# Patient Record
Sex: MALE | Race: BLACK OR AFRICAN AMERICAN | NOT HISPANIC OR LATINO | Employment: UNEMPLOYED | URBAN - METROPOLITAN AREA
[De-identification: names, ages, dates, MRNs, and addresses within clinical notes are randomized per-mention and may not be internally consistent; named-entity substitution may affect disease eponyms.]

---

## 2022-07-05 PROBLEM — L03.213 PRESEPTAL CELLULITIS: Status: ACTIVE | Noted: 2022-07-05

## 2022-07-05 PROBLEM — L03.213 PERIORBITAL CELLULITIS OF RIGHT EYE: Status: ACTIVE | Noted: 2022-07-05

## 2022-07-15 ENCOUNTER — TELEPHONE (OUTPATIENT)
Dept: OPHTHALMOLOGY | Facility: CLINIC | Age: 72
End: 2022-07-15
Payer: MEDICARE

## 2022-07-15 NOTE — TELEPHONE ENCOUNTER
----- Message from Janae Dao sent at 7/15/2022  9:40 AM CDT -----  Regarding: Urgent  ER f/u pt call back number is [phone]

## 2022-07-16 ENCOUNTER — HOSPITAL ENCOUNTER (EMERGENCY)
Facility: HOSPITAL | Age: 72
Discharge: HOME | End: 2022-07-16
Attending: EMERGENCY MEDICINE
Payer: MEDICARE

## 2022-07-16 VITALS
HEART RATE: 86 BPM | OXYGEN SATURATION: 99 % | BODY MASS INDEX: 34.53 KG/M2 | DIASTOLIC BLOOD PRESSURE: 91 MMHG | TEMPERATURE: 98 F | HEIGHT: 67 IN | WEIGHT: 220 LBS | RESPIRATION RATE: 18 BRPM | SYSTOLIC BLOOD PRESSURE: 172 MMHG

## 2022-07-16 DIAGNOSIS — H02.401 PTOSIS OF RIGHT EYELID: ICD-10-CM

## 2022-07-16 DIAGNOSIS — H02.409 PTOSIS OF EYELID, UNSPECIFIED LATERALITY: ICD-10-CM

## 2022-07-16 DIAGNOSIS — H05.229 ORBITAL SWELLING: Primary | ICD-10-CM

## 2022-07-16 LAB
BASOPHILS # BLD AUTO: 0.03 K/UL (ref 0–0.2)
BASOPHILS NFR BLD: 0.3 % (ref 0–1.9)
BUN SERPL-MCNC: 16 MG/DL (ref 6–30)
CHLORIDE SERPL-SCNC: 103 MMOL/L (ref 95–110)
CREAT SERPL-MCNC: 1.2 MG/DL (ref 0.5–1.4)
CRP SERPL-MCNC: 11.8 MG/L (ref 0–8.2)
DIFFERENTIAL METHOD: ABNORMAL
EOSINOPHIL # BLD AUTO: 0.3 K/UL (ref 0–0.5)
EOSINOPHIL NFR BLD: 2.9 % (ref 0–8)
ERYTHROCYTE [DISTWIDTH] IN BLOOD BY AUTOMATED COUNT: 16.6 % (ref 11.5–14.5)
ERYTHROCYTE [SEDIMENTATION RATE] IN BLOOD BY PHOTOMETRIC METHOD: 66 MM/HR (ref 0–23)
GLUCOSE SERPL-MCNC: 97 MG/DL (ref 70–110)
HCT VFR BLD AUTO: 46.7 % (ref 40–54)
HCT VFR BLD CALC: 45 %PCV (ref 36–54)
HGB BLD-MCNC: 14.1 G/DL (ref 14–18)
IMM GRANULOCYTES # BLD AUTO: 0.03 K/UL (ref 0–0.04)
IMM GRANULOCYTES NFR BLD AUTO: 0.3 % (ref 0–0.5)
LYMPHOCYTES # BLD AUTO: 3 K/UL (ref 1–4.8)
LYMPHOCYTES NFR BLD: 27.7 % (ref 18–48)
MCH RBC QN AUTO: 25.2 PG (ref 27–31)
MCHC RBC AUTO-ENTMCNC: 30.2 G/DL (ref 32–36)
MCV RBC AUTO: 83 FL (ref 82–98)
MONOCYTES # BLD AUTO: 0.9 K/UL (ref 0.3–1)
MONOCYTES NFR BLD: 8.3 % (ref 4–15)
NEUTROPHILS # BLD AUTO: 6.5 K/UL (ref 1.8–7.7)
NEUTROPHILS NFR BLD: 60.5 % (ref 38–73)
NRBC BLD-RTO: 0 /100 WBC
PLATELET # BLD AUTO: 319 K/UL (ref 150–450)
PMV BLD AUTO: 10.9 FL (ref 9.2–12.9)
POC IONIZED CALCIUM: 1.34 MMOL/L (ref 1.06–1.42)
POC TCO2 (MEASURED): 30 MMOL/L (ref 23–29)
POTASSIUM BLD-SCNC: 4.1 MMOL/L (ref 3.5–5.1)
RBC # BLD AUTO: 5.6 M/UL (ref 4.6–6.2)
SAMPLE: ABNORMAL
SODIUM BLD-SCNC: 142 MMOL/L (ref 136–145)
T3 SERPL-MCNC: 80 NG/DL (ref 60–180)
T4 FREE SERPL-MCNC: 0.95 NG/DL (ref 0.71–1.51)
TSH SERPL DL<=0.005 MIU/L-ACNC: 1.99 UIU/ML (ref 0.4–4)
WBC # BLD AUTO: 10.76 K/UL (ref 3.9–12.7)

## 2022-07-16 PROCEDURE — 99285 EMERGENCY DEPT VISIT HI MDM: CPT | Mod: 25

## 2022-07-16 PROCEDURE — 84439 ASSAY OF FREE THYROXINE: CPT | Performed by: PHYSICIAN ASSISTANT

## 2022-07-16 PROCEDURE — 80047 BASIC METABLC PNL IONIZED CA: CPT

## 2022-07-16 PROCEDURE — 84443 ASSAY THYROID STIM HORMONE: CPT | Performed by: PHYSICIAN ASSISTANT

## 2022-07-16 PROCEDURE — 99284 EMERGENCY DEPT VISIT MOD MDM: CPT | Mod: ,,, | Performed by: PHYSICIAN ASSISTANT

## 2022-07-16 PROCEDURE — 85652 RBC SED RATE AUTOMATED: CPT | Performed by: PHYSICIAN ASSISTANT

## 2022-07-16 PROCEDURE — 85025 COMPLETE CBC W/AUTO DIFF WBC: CPT | Performed by: PHYSICIAN ASSISTANT

## 2022-07-16 PROCEDURE — A9585 GADOBUTROL INJECTION: HCPCS | Performed by: EMERGENCY MEDICINE

## 2022-07-16 PROCEDURE — 25500020 PHARM REV CODE 255: Performed by: EMERGENCY MEDICINE

## 2022-07-16 PROCEDURE — 86140 C-REACTIVE PROTEIN: CPT | Performed by: PHYSICIAN ASSISTANT

## 2022-07-16 PROCEDURE — 99284 PR EMERGENCY DEPT VISIT,LEVEL IV: ICD-10-PCS | Mod: ,,, | Performed by: PHYSICIAN ASSISTANT

## 2022-07-16 PROCEDURE — 25000003 PHARM REV CODE 250: Performed by: PHYSICIAN ASSISTANT

## 2022-07-16 PROCEDURE — 84480 ASSAY TRIIODOTHYRONINE (T3): CPT | Performed by: PHYSICIAN ASSISTANT

## 2022-07-16 RX ORDER — GADOBUTROL 604.72 MG/ML
10 INJECTION INTRAVENOUS
Status: COMPLETED | OUTPATIENT
Start: 2022-07-16 | End: 2022-07-16

## 2022-07-16 RX ORDER — CLINDAMYCIN HYDROCHLORIDE 150 MG/1
150 CAPSULE ORAL 3 TIMES DAILY
COMMUNITY

## 2022-07-16 RX ADMIN — IOHEXOL 100 ML: 350 INJECTION, SOLUTION INTRAVENOUS at 01:07

## 2022-07-16 RX ADMIN — GADOBUTROL 10 ML: 604.72 INJECTION INTRAVENOUS at 05:07

## 2022-07-16 RX ADMIN — HYPROMELLOSE 2910 1 DROP: 5 SOLUTION OPHTHALMIC at 01:07

## 2022-07-16 NOTE — PROVIDER PROGRESS NOTES - EMERGENCY DEPT.
Encounter Date: 7/16/2022    ED Physician Progress Notes           Patient signed out to me by my colleague with instructions to follow-up pending work-up. Please see main ED note for previous ED stay documentation. Patient signed out to me with MRI and Ophthalmology recs pending.    MRI orbits and MRV brain obtained. Preseptal cellulitis findings. No postseptal or intracranial involvement. No abnormal asymmetric cavernous sinus enhancement or other focal signal abnormality. Chronic microvascular ischemic changes. Multiple remote lacunar infarcts.     Discussed with Ophthalmology, ptosis likely related to aponeurotic disinsertion 2/2 trauma and exaggerated by recent swelling from preseptal cellulitis. Patient's photo on initial presentation on 7/4/22 had blood on it. No additional ABx or eye gtts recommended at this time. Advised patient to follow-up with Ophthalmology in clinic as well as oculoplastics at [facility].       ED Diagnosis:  Final diagnoses:  [H02.409] Ptosis of eyelid, unspecified laterality  [H05.229] Orbital swelling (Primary)    ED Disposition:   ED Disposition Condition    Discharge Stable

## 2022-07-16 NOTE — ED NOTES
Patient identifiers for Luiz Reed 72 y.o. male checked and correct.  Chief Complaint   Patient presents with    Eye Problem     Swelling x 6 days. Taking abx to treat.      Past Medical History:   Diagnosis Date    Diabetes mellitus     Hypertension      Allergies reported: Review of patient's allergies indicates:  No Known Allergies      LOC: Patient is awake, alert, and aware of environment with an appropriate affect. Patient is oriented x 4 and speaking appropriately.  APPEARANCE: Patient resting comfortably and in no acute distress. Patient is clean and well groomed, patient's clothing is properly fastened.  HEENT: - Jvd, + midline trach, - bruising or lacerations to face or neck. Swollen R eyelid, no drainage or smell noted, not warm to touch.  SKIN: The skin is warm and dry. Patient has normal skin turgor and moist mucus membranes.   MUSKULOSKELETAL: Patient is moving all extremities well, no obvious deformities noted. Pulses intact. PMS x 4.  RESPIRATORY: Airway is open and patent. Respirations are spontaneous and non-labored with normal effort and rate. = CBBS. Denies SOB.  CARDIAC: No peripheral edema noted. + 2 bilateral pedal and radial pulses. < 3 s capillary refill.  ABDOMEN: No distention noted. Soft and non-tender upon palpation.  NEUROLOGICAL: pupils 3 mm, PERRL. Facial expression is symmetrical. Hand grasps are equal bilaterally. Normal sensation in all extremities when touched with finger.

## 2022-07-16 NOTE — CONSULTS
"Consultation Report  Ophthalmology Service    Date: 07/16/2022    Chief complaint/Reason for Consult: "abnormal MRI, hx of preseptal cellulitis, improvement however possible cavernous inf"      History of Present Illness: Luiz Reed is a 72 y.o. male with no significant POHx who present who presents for RUL ptosis. Patient is on day 7 of PO clindamycin for treatment of Preseptal cellulitis OD. Per chart review, swelling OD started on 7/4 when he woke with facial abrasions and swollen OD. Patient was unable to recall the trauma that preceded the swelling.  Patient states that his right eye has been closed for the past 10 days 2/2 periorbital edema, which has greatly improved while on abx. Denies any associated HA, focal neurologic deficits, scalp tenderness, fatigue, fevers/chills, vision changes, flashes, floaters, curtains/veils over vision, or ocular discomfort OU.    POcularHx: Denies history of ocular problems or past ocular surgeries. Refractive error.     Current eye gtts: Denies     Family Hx: Denies family history of glaucoma, macular degeneration, or blindness. family history is not on file.     PMHx:  has a past medical history of Diabetes mellitus and Hypertension.     PSurgHx:  has no past surgical history on file.     Home Medications:   Prior to Admission medications    Medication Sig Start Date End Date Taking? Authorizing Provider   clindamycin (CLEOCIN) 150 MG capsule Take 150 mg by mouth 3 (three) times daily.    Historical Provider        Medications this encounter:     Allergies: has No Known Allergies.     Social:  reports that he has quit smoking. He has never used smokeless tobacco. He reports that he does not use drugs.     ROS: As per HPI    Ocular examination/Dilated fundus examination:  Base Eye Exam     Visual Acuity (Snellen - Linear)       Right Left    Dist sc 20/50 20/50    Dist ph sc 20/25 -1 20/30 -2          Tonometry (Tonopen, 4:23 PM)       Right Left    Pressure 12 14       "    Pupils       Pupils Dark Light Shape React APD    Right PERRL 4 3 Round Brisk None    Left PERRL 4 3 Round Brisk None          Visual Fields       Right Left     Full Full   Full with RUL held open/taped             Extraocular Movement       Right Left     Ortho Ortho     -1 0 0   -1  0   -1 0 0    0 0 0   0  0   0 0 0             Neuro/Psych     Oriented x3: Yes          Dilation     Both eyes: 1% Tropicamide, 2.5% Phenylephrine, 2% Cyclopentolate @ 4:24 PM            Slit Lamp and Fundus Exam     External Exam       Right Left    External mild periorbital edema with RUL ptosis, no erythema or ttp, see photo Normal          Slit Lamp Exam       Right Left    Lids/Lashes RUL ptosis and edema UL> LL as above Normal    Conjunctiva/Sclera White and quiet White and quiet    Cornea Arcus Arcus    Anterior Chamber Deep and formed Deep and formed    Iris Round and reactive Round and reactive    Lens Nuclear sclerosis Nuclear sclerosis    Anterior Vitreous Normal Normal          Fundus Exam       Right Left    Disc Pink & sharp, PPA Pink & sharp, PPA    C/D Ratio 0.4 0.4    Macula Flat Flat    Vessels Normal Normal    Periphery Flat w/o heme/tear/detatchment  Flat w/o heme/tear/detatchment                   7/4/22 prior to abx initiation             7/16/22    Imaging:   CT Orbits w/ Contrast:   Impression:  There is continued but reduced soft tissue swelling induration right periorbital preseptal soft tissues concerning for evolving cellulitis without postseptal involvement.  Please note however there is subtle asymmetrical enhancement of the right cavernous sinus while this may be a developmental variant in light of clinical history active cavernous sinus inflammatory/infectious process cannot be excluded. Clinical correlation, ophthalmological evaluation and further evaluation with MRI orbits with and without contrast advised if patient compatible.    MRI Orbits w/ w/o 7/16/22  Impression:  1. Findings compatible  with known preseptal cellulitis.  No postseptal inflammation or intraorbital abscess.  2. No abnormal asymmetric cavernous sinus enhancement or other focal signal abnormality to correlate with the finding from recent orbit CT.  3. Advanced supratentorial leukoencephalopathy, nonspecific, but likely reflecting sequelae chronic microvascular ischemic change.  4. Multiple remote bilateral lacunar type infarcts including basal ganglia, thalami, and deep periventricular white matter.  No evidence of acute infarct.  5. Ventriculomegaly out of proportion of degree of sulcal enlargement.  Favor central predominant cerebral volume loss, but configuration does raise the possibility of normal pressure hydrocephalus. Clinical correlation required.    MRV Brain w/o 7/16/22:  Impression:  No evidence of dural venous sinus thrombosis.    Assessment/Plan:     1. Preseptal cellulitis RIGHT eye, RESOLVED  2. Ptosis, RIGHT eye   - Patient presents with 12 days of RUL periorbital swelling and ptosis following unclear hx of preceding trauma vs infection. Pt presented originally 7/4/22 then again on 7/10/22, where he was sent home with PO clindamycin 450mg q8h. Patient reports the swelling has significantly improved since starting abx, but he has still been unable to open the right eye.   - Patient w/ near complete ptosis of right eye with some levator fx on upgaze (6-10mm), trace RUL swelling, no proptosis, PERRL with no RAPD, EOMI w/ possibly mild limitation of Abduction of right eye in the setting of uncontrolled HTN and DM could be related to microvascular changes, no periorbital pain or pain with eye movement, no visual changes   - No sx concerning for GCA, facial sensation intact bilaterally, no photos prior to initial presentation available for comparison (left drivers license at home)   - ESR 66, CRP 11.8, WBC wnl and patient has significantly improved on Clindamycin (completed 6/7 days)   - CT Orbits w/ contrast w/ some concern  for cavernous sinus inflammation   - MRI suggestive of resolving preseptal cellulitis  without evidence of cavernous sinus involvement and MRV without evidence of dural venous sinus thrombosis   - TSH, T3/T4, and myasthenia labs ordered, pending   - 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

## 2022-07-16 NOTE — ED NOTES
I-STAT Chem-8+ Results:   Value Reference Range   Sodium 142 136-145 mmol/L   Potassium  4.1 3.5-5.1 mmol/L   Chloride 103  mmol/L   Ionized Calcium 1.34 1.06-1.42 mmol/L   CO2 (measured) 30 23-29 mmol/L   Glucose 97  mg/dL   BUN 16 6-30 mg/dL   Creatinine 1.2 0.5-1.4 mg/dL   Hematocrit 45 36-54%

## 2022-07-16 NOTE — ED TRIAGE NOTES
73 y/o M presents to ER with c/c swollen eye lid. Pt states that 10 days ago (approximately) he woke up with a swollen R eye,was hospitalized for several days, then discharged with antibiotics. Pt states that he has been taking the antibx on time. Pt denies fever, pain, or discharge in eye at this time. Pt states that he has come to ER because eye lid has not been opening as well as he would like.

## 2022-07-16 NOTE — ED PROVIDER NOTES
Encounter Date: 7/16/2022       History     Chief Complaint   Patient presents with    Eye Problem     Swelling x 6 days. Taking abx to treat.      10:44 AM  This is a 73 y/o male with PMH HTN and DM, preorbital cellulitis, discharged on 07/05/2022 with a 7 day course of clindamycin who presents to [facility] ED with L lid ptosis for 6 days.    Patient states his last dose of clindamycin yesterday.  States that he kept one dose to show the ED provider.  States that since being diagnosed with preorbital cellulitis, he has not regained function of his eyelids.  States that cannot open his L eyes anymore.  States that he will occasionally feel it open spontaneously, but just little.  Has use a Band-Aid to pull back his upper eyelid.  States that it is blurry, but when I pull his eyelids back, he states that his vision is at his baseline.  Denied having any pain since treatment for his cellulitis.  Has not had any fever, chills, trauma or injury.        Review of patient's allergies indicates:  No Known Allergies  Past Medical History:   Diagnosis Date    Diabetes mellitus     Hypertension      History reviewed. No pertinent surgical history.  History reviewed. No pertinent family history.  Social History     Tobacco Use    Smoking status: Former Smoker    Smokeless tobacco: Never Used   Substance Use Topics    Drug use: Never     Review of Systems   Constitutional: Negative for fever.   HENT: Negative for sore throat.    Eyes: Positive for visual disturbance. Negative for pain and redness.        +ptosis   Respiratory: Negative for shortness of breath.    Cardiovascular: Negative for chest pain.   Gastrointestinal: Negative for nausea.   Genitourinary: Negative for dysuria.   Musculoskeletal: Negative for back pain.   Skin: Negative for rash.   Neurological: Negative for weakness.   Hematological: Does not bruise/bleed easily.       Physical Exam     Initial Vitals [07/16/22 0925]   BP Pulse Resp Temp SpO2   (!) 186/83  88 16 98.4 °F (36.9 °C) 98 %      MAP       --         Physical Exam    Vitals reviewed.  Constitutional: He appears well-developed and well-nourished. He is not diaphoretic. He is cooperative.  Non-toxic appearance. He does not have a sickly appearance. He does not appear ill. No distress. Face mask in place.   HENT:   Head: Normocephalic and atraumatic.   Nose: Nose normal.   Mouth/Throat: No trismus in the jaw.   Eyes: Conjunctivae and EOM are normal. Pupils are equal, round, and reactive to light. Right eye exhibits no chemosis, no discharge and no exudate. Left eye exhibits no chemosis, no discharge and no exudate. Right conjunctiva is not injected. Right conjunctiva has no hemorrhage. Left conjunctiva is not injected. Left conjunctiva has no hemorrhage. No scleral icterus. Right eye exhibits no nystagmus. Left eye exhibits no nystagmus.   Ptosis on the R. See image below.  No lid erythema, edema, fluctuance, induration. Improved from image in media tab on 7/4/2022.  Unable to open eye lids.    Neck:   Normal range of motion.  Pulmonary/Chest: No accessory muscle usage. No tachypnea. No respiratory distress.   Abdominal: He exhibits no distension.   Musculoskeletal:         General: Normal range of motion.      Cervical back: Normal range of motion.     Neurological: He is alert. He has normal strength.   Skin: Skin is dry. No pallor.                 ED Course   Procedures  Labs Reviewed   CBC W/ AUTO DIFFERENTIAL - Abnormal; Notable for the following components:       Result Value    MCH 25.2 (*)     MCHC 30.2 (*)     RDW 16.6 (*)     All other components within normal limits   C-REACTIVE PROTEIN - Abnormal; Notable for the following components:    CRP 11.8 (*)     All other components within normal limits   SEDIMENTATION RATE - Abnormal; Notable for the following components:    Sed Rate 66 (*)     All other components within normal limits   ISTAT PROCEDURE - Abnormal; Notable for the following components:     POC TCO2 (MEASURED) 30 (*)     All other components within normal limits   TSH   T4, FREE   T3   MYASTHENIA GRAVIS EVAL WITH MUSK REFLEX   T4, FREE    Narrative:     ADD ON FT4 TSH T3 ORD#[id number] N RAYMOND 07/16/22 @1734   TSH    Narrative:     ADD ON FT4 TSH T3 ORD#[id number] LPN RAYMOND 07/16/22 @1734   T3    Narrative:     ADD ON FT4 TSH T3 ORD#[id number] N RAYMOND 07/16/22 @1734          Imaging Results          MRI Orbits W W/O Contrast (Final result)  Result time 07/16/22 18:51:03    Final result by Reinaldo Vogt MD (07/16/22 18:51:03)                 Impression:      1. Findings compatible with known preseptal cellulitis.  No postseptal inflammation or intraorbital abscess.  2. No abnormal asymmetric cavernous sinus enhancement or other focal signal abnormality to correlate with the finding from recent orbit CT.  3. Advanced supratentorial leukoencephalopathy, nonspecific, but likely reflecting sequelae chronic microvascular ischemic change.  4. Multiple remote bilateral lacunar type infarcts including basal ganglia, thalami, and deep periventricular white matter.  No evidence of acute infarct.  5. Ventriculomegaly out of proportion of degree of sulcal enlargement.  Favor central predominant cerebral volume loss, but configuration does raise the possibility of normal pressure hydrocephalus. Clinical correlation required.    Electronically signed by resident: Amrit Logan  Date:    07/16/2022  Time:    18:07    Electronically signed by: Reinaldo Vogt MD  Date:    07/16/2022  Time:    18:51             Narrative:    EXAMINATION:  MRI ORBITS W W/O CONTRAST    CLINICAL HISTORY:  Periorbital cellulitis (Ped 0-18y);    TECHNIQUE:  Multiplanar multisequence imaging of brain was obtained.  Additional thin slice dedicated multiplanar multisequence MR imaging of the orbits was performed before and after the administration of 10 mL Gadavist intravenous contrast.    COMPARISON:  CT orbits 07/16/2022; CT head  04/08/2022    FINDINGS:  Orbits/Optic Nerves: Asymmetric soft tissue swelling and enhancement in the right peripheral soft tissues.  No abnormal signal or enhancement of the right globe, intra-orbital soft tissues, or optic pathway.  Cavernous sinus enhancement appears normal and symmetrical in this exam, with no abnormality to correlate with the finding from same day contrast enhanced CT of the orbits.    Remainder of the Intracranial Compartment (limited evaluation):    Moderate generalized cerebral volume loss.    Prominence of the ventricles which are stable in configuration from prior head CT 04/08/2022.  Third ventricle diameter measuring up to 1.4 cm.  Ventricular enlargement out of proportion to the degree of sulcal enlargement, with some crowding of the sulci near the vertex.  Small cavum septum pellucidum et vergae.    Significant confluent T2/FLAIR signal hyperintensity throughout the periventricular supratentorial white matter extending cranially near the vertex.  No parenchymal mass or hemorrhage.  No evidence for acute infarct.  No midline shift or herniation.  Remote bilateral corona radiata infarcts.  Additional remote infarcts in the bilateral basal ganglia and thalami and right paracentral ole.  Unchanged small infarct at the right cerebellar hemisphere.    Skull/Extracranial Contents (limited evaluation): Bone marrow signal intensity is normal.  Paranasal sinuses are essentially clear.                               MRV Brain Without Contrast (Final result)  Result time 07/16/22 18:06:43    Final result by Reinaldo Vogt MD (07/16/22 18:06:43)                 Impression:      No evidence of dural venous sinus thrombosis.    Electronically signed by resident: Amrit Logan  Date:    07/16/2022  Time:    17:48    Electronically signed by: Reinaldo Vogt MD  Date:    07/16/2022  Time:    18:06             Narrative:    EXAMINATION:  MRV BRAIN WITHOUT CONTRAST    CLINICAL HISTORY:  Dural venous sinus  thrombosis suspected;    TECHNIQUE:  Non-contrast MR venogram of the intracranial vasculature with MIP reconstructions    COMPARISON:  MRI orbits 07/16/2022; CT head 04/08/2022    FINDINGS:  Normal flow related signal within the dural venous sinuses and deep cerebral veins.                                CT ORBITS WITH CONTRAST (Final result)  Result time 07/16/22 14:27:07    Final result by Scott Andrade DO (07/16/22 14:27:07)                 Impression:      There is continued but reduced soft tissue swelling induration right periorbital preseptal soft tissues concerning for evolving cellulitis without postseptal involvement.    Please note however there is subtle asymmetrical enhancement of the right cavernous sinus while this may be a developmental variant in light of clinical history active cavernous sinus inflammatory/infectious process cannot be excluded.  Clinical correlation, ophthalmological evaluation and further evaluation with MRI orbits with and without contrast advised if patient compatible.    Electronically signed by resident: Harshal Craig  Date:    07/16/2022  Time:    13:29    Electronically signed by: Scott Andrade DO  Date:    07/16/2022  Time:    14:27             Narrative:    EXAMINATION:  CT ORBITS WITH CONTRAST    CLINICAL HISTORY:  Periorbital cellulitis (Ped 0-18y);    TECHNIQUE:  Low dose CT images throughout the region of the orbits administration of 100 mL of Omnipaque 350 IV contrast..  Axial, sagittal and coronal reformations were obtained.    COMPARISON:  CT max face 07/04/2022    FINDINGS:  Interval reduction of abnormal soft tissue swelling induration overlying the right periorbital preseptal soft tissues.  No evidence for postseptal induration.  There is continued question bilateral globe proptosis.  The globes and extraocular muscles are relatively symmetric.  No evidence for intra or extraconal enhancing mass lesion.    Trace mucosal thickening in the ethmoid air cells  remaining visualized paranasal sinuses are clear.    Incidental remote fracture deformities of the nasal bones.    Somewhat unusual asymmetrical enhancement in the right cavernous sinus compared to the left in light of history cannot exclude inflammatory/infectious process involving the right cavernous sinus Hansel-Hunt to be considered in differential.  Clinical correlation, ophthalmological evaluation and further evaluation with MRI orbits with and without contrast if patient compatible.    This report was flagged in Epic as abnormal.                                 Medications   iohexoL (OMNIPAQUE 350) injection 100 mL (100 mLs Intravenous Given 7/16/22 1324)   gadobutroL (GADAVIST) injection 10 mL (10 mLs Intravenous Given 7/16/22 1746)     Medical Decision Making:   History:   Old Medical Records: I decided to obtain old medical records.  Old Records Summarized: records from clinic visits and records from previous admission(s).  Initial Assessment:   This is a 71 y/o male with PMH HTN and DM, preorbital cellulitis, discharged on 07/05/2022 with a 7 day course of clindamycin who presents to [facility] ED with L lid ptosis for 6 days.  Differential Diagnosis:   Includes but is not limited to ptosis, palsy, preorbital cellulitis, preseptal cellulitis.  Doubt although cellulitis.  No injection own, hemorrhage, discharge.  EOMs intact and symmetric.  Is  Clinical Tests:   Lab Tests: Reviewed and Ordered  Radiological Study: Ordered and Reviewed  ED Management:  Will initiate workup she, obtain CT orbits, and continue monitor.               ED Course as of 07/16/22 2243   Sat Jul 16, 2022   1042 BP(!): 186/83 [CL]   1042 Temp: 98.4 °F (36.9 °C) [CL]   1042 Pulse: 88 [CL]   1042 Resp: 16 [CL]   1042 SpO2: 98 % [CL]   1125 Case discussed with Ophthalmology who recommended starting with CT. [CL]   1459 CT shows reduced soft tissue swelling periorbital preseptal soft tissue, signifying evolving cellulitis.  There is asymmetric  enhancement of the right cavernous sinus.  Case discussed with Ophtho who will give recommendations. Will obtain MRI.  [CL]      ED Course User Index  [CL] Binta Urrutia PA-C           4:00 PM  Case will be signed out to in coming ED team pending MRI and MRV results and ophthalmology evaluation.  Refer to their consult note.  Refer to progress note.    UPDATE:  MR shows no post septal inflammation. No abnormal asymmetric cavernous sinus enhancement as seen on recent orbital CT. Remote infarcts. Note acute infarcts. Ophth OK for discharge with follow up with Occuloplastics. Refer to their full consult note.         I have reviewed patient's chart and discussed this case with my supervising MD.     Binta Urrutia PA-C  Emergent Department  [facility]  Spectralink #[id number] or #[phone]        Clinical Impression:   Final diagnoses:  [H02.409] Ptosis of eyelid, unspecified laterality  [H05.229] Orbital swelling (Primary)          ED Disposition Condition    Discharge Stable        ED Prescriptions     None        Follow-up Information     Follow up With Specialties Details Why Contact Info Additional Information    [facility] - [address] [place] [address] Ophthalmology   [address]  [facility] [address] [phone] Please arrive on the 10th floor for check-in.    [facility] Surgical Oncology, Orthopedic Surgery, Genetics, Physical Medicine and Rehabilitation, Occupational Therapy, Radiology  Oculoplastics [address] [facility] [address]  [phone]       [facility] Ophthalmology, Optometry   [address] [facility] [address]  [phone]                Binta Urrutia PA-C  07/16/22 5119

## 2022-07-17 NOTE — ED NOTES
PA at bedside to explain test and POC and dc isntrx. Pt will require Lyft to go home ( tire flat on his truck).

## 2022-07-17 NOTE — DISCHARGE INSTRUCTIONS
Follow-up with our eye clinic using the below contact information.    Additionally follow-up with ocular plastics at [facility] for management. Use the below contact information to obtain an appointment.    Return to the emergency room for new, worsening, or concerning symptoms.

## 2022-07-19 ENCOUNTER — TELEPHONE (OUTPATIENT)
Dept: OPHTHALMOLOGY | Facility: CLINIC | Age: 72
End: 2022-07-19
Payer: MEDICARE

## 2022-07-19 NOTE — TELEPHONE ENCOUNTER
I called [facility] left message this pt needs to be seen at the next available appointment for ptosis eval. Pt. Phone # and  provided

## 2024-06-07 NOTE — TELEPHONE ENCOUNTER
----- Message from Rosemarie Solo sent at 7/19/2022 10:57 AM CDT -----  Regarding: FW: Follow up  MD is asking for it to be sent to [facility]   ----- Message -----  From: Diana Dupree MA  Sent: 7/18/2022  10:40 AM CDT  To: Jeannie Carlin PCT, Rosemarie Solo  Subject: FW: Follow up                                      ----- Message -----  From: Konstantin Ron MD  Sent: 7/16/2022   7:06 PM CDT  To: [facility] Triage  Subject: Follow up                                        Hi,    Can we please get this patient follow up in [facility] Oculoplastics clinic next available for Right eye ptosis evaluation. Thanks!    Patient's Best Contact Number: [phone]      Konstantin Ron MD  [facility], PGY-2           Thank you for coming to the ER today.    Follow up with your regular doctor (or the doctor listed in your discharge instructions) in 1-2 days. Call to make an appointment.     Return to the ER immediately for worsening symptoms of low back pain, or new fevers, headache, chest pain, shortness of breath, vomiting, abdominal pain, weakness, or other symptoms as discussed in the ER today.

## 2025-03-16 ENCOUNTER — HOSPITAL ENCOUNTER (EMERGENCY)
Facility: HOSPITAL | Age: 75
End: 2025-03-16
Attending: STUDENT IN AN ORGANIZED HEALTH CARE EDUCATION/TRAINING PROGRAM
Payer: MEDICARE

## 2025-03-16 VITALS
HEART RATE: 68 BPM | SYSTOLIC BLOOD PRESSURE: 144 MMHG | DIASTOLIC BLOOD PRESSURE: 78 MMHG | OXYGEN SATURATION: 99 % | HEIGHT: 67 IN | TEMPERATURE: 98 F | RESPIRATION RATE: 16 BRPM | BODY MASS INDEX: 34.53 KG/M2 | WEIGHT: 220 LBS

## 2025-03-16 DIAGNOSIS — M25.569 KNEE PAIN: ICD-10-CM
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
## 2025-03-16 DIAGNOSIS — R05.9 COUGH: ICD-10-CM

## 2025-03-16 LAB
ALBUMIN SERPL BCP-MCNC: 2.8 G/DL (ref 3.5–5.2)
ALP SERPL-CCNC: 71 U/L (ref 40–150)
ALT SERPL W/O P-5'-P-CCNC: 15 U/L (ref 10–44)
ANION GAP SERPL CALC-SCNC: 6 MMOL/L (ref 8–16)
AST SERPL-CCNC: 14 U/L (ref 10–40)
BASOPHILS # BLD AUTO: 0.04 K/UL (ref 0–0.2)
BASOPHILS NFR BLD: 0.5 % (ref 0–1.9)
BILIRUB SERPL-MCNC: 0.3 MG/DL (ref 0.1–1)
BNP SERPL-MCNC: 52 PG/ML (ref 0–99)
BUN SERPL-MCNC: 15 MG/DL (ref 8–23)
CALCIUM SERPL-MCNC: 9.2 MG/DL (ref 8.7–10.5)
CHLORIDE SERPL-SCNC: 110 MMOL/L (ref 95–110)
CO2 SERPL-SCNC: 22 MMOL/L (ref 23–29)
CREAT SERPL-MCNC: 0.9 MG/DL (ref 0.5–1.4)
DIFFERENTIAL METHOD BLD: ABNORMAL
EOSINOPHIL # BLD AUTO: 0.6 K/UL (ref 0–0.5)
EOSINOPHIL NFR BLD: 7.7 % (ref 0–8)
ERYTHROCYTE [DISTWIDTH] IN BLOOD BY AUTOMATED COUNT: 14.8 % (ref 11.5–14.5)
EST. GFR  (NO RACE VARIABLE): >60 ML/MIN/1.73 M^2
GLUCOSE SERPL-MCNC: 87 MG/DL (ref 70–110)
HCT VFR BLD AUTO: 40.2 % (ref 40–54)
HCV AB SERPL QL IA: NORMAL
HGB BLD-MCNC: 11.9 G/DL (ref 14–18)
HIV 1+2 AB+HIV1 P24 AG SERPL QL IA: NORMAL
IMM GRANULOCYTES # BLD AUTO: 0.03 K/UL (ref 0–0.04)
IMM GRANULOCYTES NFR BLD AUTO: 0.4 % (ref 0–0.5)
INFLUENZA A, MOLECULAR: NEGATIVE
INFLUENZA B, MOLECULAR: NEGATIVE
LYMPHOCYTES # BLD AUTO: 2 K/UL (ref 1–4.8)
LYMPHOCYTES NFR BLD: 26.7 % (ref 18–48)
MCH RBC QN AUTO: 24.3 PG (ref 27–31)
MCHC RBC AUTO-ENTMCNC: 29.6 G/DL (ref 32–36)
MCV RBC AUTO: 82 FL (ref 82–98)
MONOCYTES # BLD AUTO: 0.5 K/UL (ref 0.3–1)
MONOCYTES NFR BLD: 6.8 % (ref 4–15)
NEUTROPHILS # BLD AUTO: 4.2 K/UL (ref 1.8–7.7)
NEUTROPHILS NFR BLD: 57.9 % (ref 38–73)
NRBC BLD-RTO: 0 /100 WBC
PLATELET # BLD AUTO: 339 K/UL (ref 150–450)
PMV BLD AUTO: 12.1 FL (ref 9.2–12.9)
POTASSIUM SERPL-SCNC: 3.9 MMOL/L (ref 3.5–5.1)
PROT SERPL-MCNC: 7.3 G/DL (ref 6–8.4)
RBC # BLD AUTO: 4.9 M/UL (ref 4.6–6.2)
SARS-COV-2 RDRP RESP QL NAA+PROBE: NEGATIVE
SODIUM SERPL-SCNC: 138 MMOL/L (ref 136–145)
SPECIMEN SOURCE: NORMAL
TROPONIN I SERPL DL<=0.01 NG/ML-MCNC: 9 NG/L (ref 0–35)
WBC # BLD AUTO: 7.31 K/UL (ref 3.9–12.7)

## 2025-03-16 PROCEDURE — 93005 ELECTROCARDIOGRAM TRACING: CPT

## 2025-03-16 PROCEDURE — 87502 INFLUENZA DNA AMP PROBE: CPT

## 2025-03-16 PROCEDURE — 25500020 PHARM REV CODE 255: Performed by: STUDENT IN AN ORGANIZED HEALTH CARE EDUCATION/TRAINING PROGRAM

## 2025-03-16 PROCEDURE — 84484 ASSAY OF TROPONIN QUANT: CPT

## 2025-03-16 PROCEDURE — 85025 COMPLETE CBC W/AUTO DIFF WBC: CPT

## 2025-03-16 PROCEDURE — 87389 HIV-1 AG W/HIV-1&-2 AB AG IA: CPT | Performed by: PHYSICIAN ASSISTANT

## 2025-03-16 PROCEDURE — 99285 EMERGENCY DEPT VISIT HI MDM: CPT | Mod: 25

## 2025-03-16 PROCEDURE — 83880 ASSAY OF NATRIURETIC PEPTIDE: CPT

## 2025-03-16 PROCEDURE — 25000003 PHARM REV CODE 250

## 2025-03-16 PROCEDURE — 87635 SARS-COV-2 COVID-19 AMP PRB: CPT

## 2025-03-16 PROCEDURE — A9585 GADOBUTROL INJECTION: HCPCS | Performed by: STUDENT IN AN ORGANIZED HEALTH CARE EDUCATION/TRAINING PROGRAM

## 2025-03-16 PROCEDURE — 86803 HEPATITIS C AB TEST: CPT | Performed by: PHYSICIAN ASSISTANT

## 2025-03-16 PROCEDURE — 80053 COMPREHEN METABOLIC PANEL: CPT | Performed by: STUDENT IN AN ORGANIZED HEALTH CARE EDUCATION/TRAINING PROGRAM

## 2025-03-16 RX ORDER — GADOBUTROL 604.72 MG/ML
10 INJECTION INTRAVENOUS
Status: COMPLETED | OUTPATIENT
Start: 2025-03-16 | End: 2025-03-16

## 2025-03-16 RX ORDER — ACETAMINOPHEN 325 MG/1
650 TABLET ORAL
Status: COMPLETED | OUTPATIENT
Start: 2025-03-16 | End: 2025-03-16

## 2025-03-16 RX ADMIN — ACETAMINOPHEN 650 MG: 325 TABLET ORAL at 09:03

## 2025-03-16 RX ADMIN — GADOBUTROL 10 ML: 604.72 INJECTION INTRAVENOUS at 03:03

## 2025-03-16 NOTE — ED PROVIDER NOTES
Encounter Date: 3/16/2025    Physician Attestation Statement for Resident:  As the supervising MD   Physician Attestation Statement: I have personally seen and examined this patient.       I agree with the history, exam, and treatment plan.               History     Chief Complaint   Patient presents with    Fall     Arrived from [facility]. Hx of dementia. unwitnessed fall. No visible trauma or injury     HPI    Luiz Reed is a 74 y.o. male w/ a PMHx significant dementia, diabetes, HTN.  Patient presents from [facility] for concerns of an unwitnessed fall via EMS reported no interventions or abnl vitals en route.  Patient states that he does not remember if he fell, but he does recall waking up and having nursing staff telling him that he fell.  Patient is currently complaining of mild left knee pain since the fall.  Patient also w/productive cough; recently diagnosed w/ COVID on 03/04.  Patient denies lightheadedness/dizziness, vision changes, HA, weakness, paresthesias, back pain, CP, SOB, abdominal pain, N/V/D, dysuria/hematuria, lower extremity edema/erythema, or other areas of pain/trauma.    Please see MDM for additional details.    Review of patient's allergies indicates:  No Known Allergies  Past Medical History:   Diagnosis Date    Diabetes mellitus     borderline    Diabetes mellitus     Hypertension      History reviewed. No pertinent surgical history.  No family history on file.  Social History[1]  Review of Systems    Physical Exam     Initial Vitals   BP Pulse Resp Temp SpO2   03/16/25 0839 03/16/25 0839 03/16/25 0839 03/16/25 0840 03/16/25 0839   138/79 87 17 98.3 °F (36.8 °C) 100 %      MAP       --                Physical Exam    Nursing note and vitals reviewed.  Constitutional: He appears well-developed and well-nourished. No distress.   HENT:   Head: Normocephalic and atraumatic.   Nose: Nose normal.   Eyes: Conjunctivae and EOM are normal. Pupils are equal, round,  and reactive to light. No scleral icterus.   Neck:   Normal range of motion.  Cardiovascular:  Normal rate, normal heart sounds and intact distal pulses.           Pulmonary/Chest: Breath sounds normal. No respiratory distress. He exhibits no tenderness.   Abdominal: Abdomen is soft. He exhibits no distension. There is no abdominal tenderness.   Musculoskeletal:         General: No tenderness or edema. Normal range of motion.      Cervical back: Normal range of motion.     Neurological: He has normal strength. No sensory deficit.   Mentation at baseline per report.   Skin: Skin is warm and dry. Capillary refill takes less than 2 seconds.         ED Course   Procedures  Labs Reviewed   CBC W/ AUTO DIFFERENTIAL - Abnormal       Result Value    WBC 7.31      RBC 4.90      Hemoglobin 11.9 (*)     Hematocrit 40.2      MCV 82      MCH 24.3 (*)     MCHC 29.6 (*)     RDW 14.8 (*)     Platelets 339      MPV 12.1      Immature Granulocytes 0.4      Gran # (ANC) 4.2      Immature Grans (Abs) 0.03      Lymph # 2.0      Mono # 0.5      Eos # 0.6 (*)     Baso # 0.04      nRBC 0      Gran % 57.9      Lymph % 26.7      Mono % 6.8      Eosinophil % 7.7      Basophil % 0.5      Differential Method Automated     COMPREHENSIVE METABOLIC PANEL - Abnormal    Sodium 138      Potassium 3.9      Chloride 110      CO2 22 (*)     Glucose 87      BUN 15      Creatinine 0.9      Calcium 9.2      Total Protein 7.3      Albumin 2.8 (*)     Total Bilirubin 0.3      Alkaline Phosphatase 71      AST 14      ALT 15      eGFR >60.0      Anion Gap 6 (*)    INFLUENZA A & B BY MOLECULAR    Influenza A, Molecular Negative      Influenza B, Molecular Negative      Flu A & B Source Nasal swab     HEPATITIS C ANTIBODY    Hepatitis C Ab Non-reactive      Narrative:     Release to patient->Immediate   HIV 1 / 2 ANTIBODY    HIV 1/2 Ag/Ab Non-reactive      Narrative:     Release to patient->Immediate   SARS-COV-2 RNA AMPLIFICATION, QUAL    SARS-CoV-2 RNA,  Amplification, Qual Negative     TROPONIN I HIGH SENSITIVITY    Troponin I High Sensitivity 9     B-TYPE NATRIURETIC PEPTIDE    BNP 52            Imaging Results              MRI Brain W WO Contrast (Final result)  Result time 03/16/25 15:22:07      Final result by Scott Andrade DO (03/16/25 15:22:07)                   Impression:      Generalized cerebral volume loss with scattered areas of encephalomalacia bilateral corona radiata, thalami, right ole and cerebellum most compatible with remote infarcts    Patchy and confluent T2 FLAIR signal abnormality elsewhere supratentorial white matter and ole while nonspecific concerning for possible age advanced chronic microvascular ischemic change.    No evidence for acute infarction or abnormal parenchymal enhancement allowing for artifact from motion    Punctate small foci of susceptibility bilateral thalami with probable additional focus in the left posterior temporal lobe suggestive for remote microhemorrhages.  No evidence for recent hemorrhage    Clinical correlation and follow-up advised      Electronically signed by: Scott Andrade DO  Date:    03/16/2025  Time:    15:22               Narrative:    EXAMINATION:  MRI BRAIN W WO CONTRAST    CLINICAL HISTORY:  Stroke, follow up;    TECHNIQUE:  Sagittal and axial T1, axial T2, axial FLAIR, axial gradient, axial diffusion imaging of the whole brain pre-contrast with postcontrast axial T1 and axial spoiled gradient imaging reformatted in the coronal and sagittal planes.. Ten ml of Gadavist injected intravenously.    COMPARISON:  CT 03/16/2025    FINDINGS:  Study distorted by motion artifacts.  Generalized cerebral volume loss with compensatory enlargement ventricles sulci and cisterns without hydrocephalus.  Confluent T2 FLAIR signal hyperintensity supratentorial white matter and ole while nonspecific concerning for advanced chronic microvascular ischemic change.  Small scattered areas of encephalomalacia bilateral  corona radiata, basal ganglia, thalamus, right ole and cerebellum compatible with remote infarcts.  Punctate foci of susceptibility within the thalami with small component within the left superior thalamus and left posterior temporal lobe suggestive for remote microhemorrhages possibly related to chronic hypertension clinical correlation advised    No evidence for recent hemorrhage.  No evidence for acute infarction.  No definite abnormal parenchymal enhancement allowing for motion artifacts.                                       X-Ray Abdomen AP 1 View (KUB) (Final result)  Result time 03/16/25 14:20:25      Final result by Reinaldo Vogt MD (03/16/25 14:20:25)                   Impression:      No radiographic acute abnormality identified.      Electronically signed by: Reinaldo Vogt MD  Date:    03/16/2025  Time:    14:20               Narrative:    EXAMINATION:  XR ABDOMEN AP 1 VIEW    CLINICAL HISTORY:  Unspecified fall, initial encounter    TECHNIQUE:  AP View(s) of the abdomen was performed.    COMPARISON:  Chest radiograph same day    FINDINGS:  Monitoring leads overlie the imaged chest and abdomen.    Nonobstructive bowel gas pattern.  Organomegaly or significant mass effect.  No free air or abnormal intra-abdominal calcification, noting suspected pelvic phleboliths and scattered advanced atherosclerotic vascular calcifications.    Osseous structures show moderate to advanced degenerative changes of the imaged spine with minimal to mild degenerative change elsewhere.  No acute or destructive osseous process seen.  The imaged lung bases are clear.                                        CT Head Without Contrast (Final result)  Result time 03/16/25 10:15:28      Final result by Scott Andrade DO (03/16/25 10:15:28)                   Impression:      CT head: Generalized cerebral volume loss with patchy and confluent decreased attenuation supratentorial white matter while nonspecific concerning for chronic  ischemic change.  Focal hypodensities left corona radiata, right basal ganglia and bilateral thalami suggestive for remote lacunar-type infarcts.  Superimposed recent infarction cannot be entirely excluded with lack of priors for comparison.  No evidence for acute intracranial hemorrhage or sulcal effacement to suggest large territory recent infarction.  Clinical correlation and further evaluation with MRI as warranted if patient compatible.    Please note there are partially visualized fracture deformity nasal bones bilaterally overall age indeterminate and may be remote.    CT cervical spine: Degenerative change cervical spine without evidence for acute fracture or traumatic subluxation.    See above for additional details.      Electronically signed by: Scott Andrade DO  Date:    03/16/2025  Time:    10:15               Narrative:    EXAMINATION:  CT HEAD WITHOUT CONTRAST; CT CERVICAL SPINE WITHOUT CONTRAST    CLINICAL HISTORY:  Head trauma, minor (Age >= 65y);; Neck trauma (Age >= 65y);    TECHNIQUE:  CT head: Multiple sequential 5 mm axial images of the head without contrast.  Coronal and sagittal reformatted imaging from the axial acquisition.    CT cervical spine: Multiple sequential 1.25 mm axial images of the cervical spine without contrast.  Coronal and sagittal reformatted imaging from the axial acquisition.    COMPARISON:  None    FINDINGS:  CT head: Generalized cerebral volume loss with compensatory enlargement ventricles sulci and cisterns without hydrocephalus.  Hypodensity within the left corona radiata and right basal ganglia most compatible with remote lacunar-type infarcts with additional punctate foci in the bilateral thalami.  There is patchy and confluent decreased attenuation elsewhere supratentorial white matter while nonspecific concerning for advanced chronic microvascular ischemic change.  There is no evidence for acute intracranial hemorrhage or definite large territory recent infarction.   Superimposed recent infarction cannot be entirely excluded.  Further evaluation with MRI as warranted if patient compatible.  There is fracture deformity of the nasal bones bilaterally partially included an age-indeterminate which may be remote.  Visualized paranasal sinuses and mastoid air cells are clear.    CT cervical spine: Straightening of the expected normal cervical lordosis.  There is scattered degenerative disc disease with height loss and endplate degeneration all levels there is diffuse sclerosis within C5 and C6 vertebral bodies likely degenerative.  Allowing for degenerative changes there is no evidence for acute fracture or traumatic subluxation of the cervical spine incomplete ossification posterior arch of C1 compatible with developmental variant.  No consolidation visualized lung apices.  Allowing for artifact from motion and beam hardening degenerative change most pronounced at C5/C6 with posterior disc osteophyte, uncovertebral joint hypertrophy and facet arthropathy with mild moderate central canal stenosis with moderate right greater than left bilateral bony neural foraminal stenosis.    This report was flagged in Epic as abnormal.                                        CT Cervical Spine Without Contrast (Final result)  Result time 03/16/25 10:15:28      Final result by Scott Andrade DO (03/16/25 10:15:28)                   Impression:      CT head: Generalized cerebral volume loss with patchy and confluent decreased attenuation supratentorial white matter while nonspecific concerning for chronic ischemic change.  Focal hypodensities left corona radiata, right basal ganglia and bilateral thalami suggestive for remote lacunar-type infarcts.  Superimposed recent infarction cannot be entirely excluded with lack of priors for comparison.  No evidence for acute intracranial hemorrhage or sulcal effacement to suggest large territory recent infarction.  Clinical correlation and further evaluation  with MRI as warranted if patient compatible.    Please note there are partially visualized fracture deformity nasal bones bilaterally overall age indeterminate and may be remote.    CT cervical spine: Degenerative change cervical spine without evidence for acute fracture or traumatic subluxation.    See above for additional details.      Electronically signed by: Scott Andrade DO  Date:    03/16/2025  Time:    10:15               Narrative:    EXAMINATION:  CT HEAD WITHOUT CONTRAST; CT CERVICAL SPINE WITHOUT CONTRAST    CLINICAL HISTORY:  Head trauma, minor (Age >= 65y);; Neck trauma (Age >= 65y);    TECHNIQUE:  CT head: Multiple sequential 5 mm axial images of the head without contrast.  Coronal and sagittal reformatted imaging from the axial acquisition.    CT cervical spine: Multiple sequential 1.25 mm axial images of the cervical spine without contrast.  Coronal and sagittal reformatted imaging from the axial acquisition.    COMPARISON:  None    FINDINGS:  CT head: Generalized cerebral volume loss with compensatory enlargement ventricles sulci and cisterns without hydrocephalus.  Hypodensity within the left corona radiata and right basal ganglia most compatible with remote lacunar-type infarcts with additional punctate foci in the bilateral thalami.  There is patchy and confluent decreased attenuation elsewhere supratentorial white matter while nonspecific concerning for advanced chronic microvascular ischemic change.  There is no evidence for acute intracranial hemorrhage or definite large territory recent infarction.  Superimposed recent infarction cannot be entirely excluded.  Further evaluation with MRI as warranted if patient compatible.  There is fracture deformity of the nasal bones bilaterally partially included an age-indeterminate which may be remote.  Visualized paranasal sinuses and mastoid air cells are clear.    CT cervical spine: Straightening of the expected normal cervical lordosis.  There is  scattered degenerative disc disease with height loss and endplate degeneration all levels there is diffuse sclerosis within C5 and C6 vertebral bodies likely degenerative.  Allowing for degenerative changes there is no evidence for acute fracture or traumatic subluxation of the cervical spine incomplete ossification posterior arch of C1 compatible with developmental variant.  No consolidation visualized lung apices.  Allowing for artifact from motion and beam hardening degenerative change most pronounced at C5/C6 with posterior disc osteophyte, uncovertebral joint hypertrophy and facet arthropathy with mild moderate central canal stenosis with moderate right greater than left bilateral bony neural foraminal stenosis.    This report was flagged in Epic as abnormal.                                       X-Ray Knee 3 View Left (Final result)  Result time 03/16/25 10:40:31      Final result by Nikky Su MD (03/16/25 10:40:31)                   Impression:      No fracture or dislocation.      Electronically signed by: Nikky Su  Date:    03/16/2025  Time:    10:40               Narrative:    EXAMINATION:  XR KNEE 3 VIEW LEFT    CLINICAL HISTORY:  Unspecified fall, initial encounter    TECHNIQUE:  AP, lateral, and Merchant views of the left knee were performed.    COMPARISON:  None    FINDINGS:  No fracture or dislocation.  The joint spaces are maintained.  No joint effusion.  Extensive vascular calcifications are present.                                       X-Ray Chest AP Portable (Final result)  Result time 03/16/25 10:39:23      Final result by Nikky Su MD (03/16/25 10:39:23)                   Impression:      No acute cardiopulmonary disease      Electronically signed by: Nikky Su  Date:    03/16/2025  Time:    10:39               Narrative:    EXAMINATION:  XR CHEST AP PORTABLE    CLINICAL HISTORY:  Cough, unspecified    TECHNIQUE:  Single frontal view of the chest was  performed.    COMPARISON:  None    FINDINGS:  The lungs are clear.  No pleural effusion.  Heart size is within normal limits.                                       Medications   acetaminophen tablet 650 mg (650 mg Oral Given 3/16/25 2347)   gadobutroL (GADAVIST) injection 10 mL (10 mLs Intravenous Given 3/16/25 1500)     Medical Decision Making  Luiz Reed is a 74 y.o. male who presents to the emergency department for evaluation after an unwitnessed fall as detailed in HPI. On initial evaluation, patient pleasantly demented but in NAD.  VSS. On exam, patient has no obvious evidence of trauma including lacerations, abrasions, bruising or apparent areas of tenderness.  Patient is demented, but roughly at his baseline mental status per report.  No focal neurologic deficits.  Otherwise, physical exam largely unremarkable.  Patient given Tylenol for his complaints of knee pain. On re-evaluation, patient has no significant change in physical exam, VS, or overall clinical status.     Pertinent Labs:  Labs largely unremarkable w/o evidence of infection, anemia, electrolyte derangement, ACS, or viral infection (COVID/flu negative).    Pertinent Imaging:  X-ray knee shows no acute fracture, dislocation, deformity.  CXR largely unremarkable.  CT head and CT cervical spine show areas of chronic ischemic change, but did have questionable superimposed recent infarct, which will be further evaluated w/ MRI.  No evidence of ICH or recent LVO.  No cervical spine injury identified. MRI pending at time of handoff.     Ddx including, but not limited to: ICH v. CVA v. MSK injury v. URI v. PNA      Most likely Dx:  At this time, I do have highest suspicion for minor soft tissue injury.  Difficult to definitively rule out acute on chronic CVA until MRI returns.  Lower suspicion for ICH, cervical spine injury.     Disposition:   Pending at time of handoff.     Please see HPI, physical exam, ED course for additional details.    Amount  and/or Complexity of Data Reviewed  Labs: ordered.  Radiology: ordered.    Risk  OTC drugs.  Prescription drug management.                                      Clinical Impression:  Final diagnoses:  [M25.569] Knee pain  [W19.XXXA] Fall  [R05.9] Cough  [W19.XXXA] Fall - MRI needed                   Celso Chandler MD  Resident  03/16/25 5323         [1]   Social History  Tobacco Use    Smoking status: Former    Smokeless tobacco: Never   Substance Use Topics    Drug use: Never        Nabil Winston MD  03/16/25 1601

## 2025-03-16 NOTE — DISCHARGE INSTRUCTIONS
As discussed, your symptoms likely represent a viral illness. Unfortunately there are no specific medicines to make the illness end faster. Antibiotics do not work for viral illnesses because they do not kill viruses.    Tests today showed:   Labs Reviewed   CBC W/ AUTO DIFFERENTIAL - Abnormal       Result Value    WBC 7.31      RBC 4.90      Hemoglobin 11.9 (*)     Hematocrit 40.2      MCV 82      MCH 24.3 (*)     MCHC 29.6 (*)     RDW 14.8 (*)     Platelets 339      MPV 12.1      Immature Granulocytes 0.4      Gran # (ANC) 4.2      Immature Grans (Abs) 0.03      Lymph # 2.0      Mono # 0.5      Eos # 0.6 (*)     Baso # 0.04      nRBC 0      Gran % 57.9      Lymph % 26.7      Mono % 6.8      Eosinophil % 7.7      Basophil % 0.5      Differential Method Automated     COMPREHENSIVE METABOLIC PANEL - Abnormal    Sodium 138      Potassium 3.9      Chloride 110      CO2 22 (*)     Glucose 87      BUN 15      Creatinine 0.9      Calcium 9.2      Total Protein 7.3      Albumin 2.8 (*)     Total Bilirubin 0.3      Alkaline Phosphatase 71      AST 14      ALT 15      eGFR >60.0      Anion Gap 6 (*)    INFLUENZA A & B BY MOLECULAR    Influenza A, Molecular Negative      Influenza B, Molecular Negative      Flu A & B Source Nasal swab     HEPATITIS C ANTIBODY    Hepatitis C Ab Non-reactive      Narrative:     Release to patient->Immediate   HIV 1 / 2 ANTIBODY    HIV 1/2 Ag/Ab Non-reactive      Narrative:     Release to patient->Immediate   SARS-COV-2 RNA AMPLIFICATION, QUAL    SARS-CoV-2 RNA, Amplification, Qual Negative     TROPONIN I HIGH SENSITIVITY    Troponin I High Sensitivity 9     B-TYPE NATRIURETIC PEPTIDE    BNP 52       Imaging Results              MRI Brain W WO Contrast (Final result)  Result time 03/16/25 15:22:07      Final result by Scott Andrade DO (03/16/25 15:22:07)                   Impression:      Generalized cerebral volume loss with scattered areas of encephalomalacia bilateral corona radiata,  thalami, right ole and cerebellum most compatible with remote infarcts    Patchy and confluent T2 FLAIR signal abnormality elsewhere supratentorial white matter and ole while nonspecific concerning for possible age advanced chronic microvascular ischemic change.    No evidence for acute infarction or abnormal parenchymal enhancement allowing for artifact from motion    Punctate small foci of susceptibility bilateral thalami with probable additional focus in the left posterior temporal lobe suggestive for remote microhemorrhages.  No evidence for recent hemorrhage    Clinical correlation and follow-up advised      Electronically signed by: Scott Andrade DO  Date:    03/16/2025  Time:    15:22               Narrative:    EXAMINATION:  MRI BRAIN W WO CONTRAST    CLINICAL HISTORY:  Stroke, follow up;    TECHNIQUE:  Sagittal and axial T1, axial T2, axial FLAIR, axial gradient, axial diffusion imaging of the whole brain pre-contrast with postcontrast axial T1 and axial spoiled gradient imaging reformatted in the coronal and sagittal planes.. Ten ml of Gadavist injected intravenously.    COMPARISON:  CT 03/16/2025    FINDINGS:  Study distorted by motion artifacts.  Generalized cerebral volume loss with compensatory enlargement ventricles sulci and cisterns without hydrocephalus.  Confluent T2 FLAIR signal hyperintensity supratentorial white matter and ole while nonspecific concerning for advanced chronic microvascular ischemic change.  Small scattered areas of encephalomalacia bilateral corona radiata, basal ganglia, thalamus, right ole and cerebellum compatible with remote infarcts.  Punctate foci of susceptibility within the thalami with small component within the left superior thalamus and left posterior temporal lobe suggestive for remote microhemorrhages possibly related to chronic hypertension clinical correlation advised    No evidence for recent hemorrhage.  No evidence for acute infarction.  No definite  abnormal parenchymal enhancement allowing for motion artifacts.                                       X-Ray Abdomen AP 1 View (KUB) (Final result)  Result time 03/16/25 14:20:25      Final result by Reinaldo Vogt MD (03/16/25 14:20:25)                   Impression:      No radiographic acute abnormality identified.      Electronically signed by: Reinaldo Vogt MD  Date:    03/16/2025  Time:    14:20               Narrative:    EXAMINATION:  XR ABDOMEN AP 1 VIEW    CLINICAL HISTORY:  Unspecified fall, initial encounter    TECHNIQUE:  AP View(s) of the abdomen was performed.    COMPARISON:  Chest radiograph same day    FINDINGS:  Monitoring leads overlie the imaged chest and abdomen.    Nonobstructive bowel gas pattern.  Organomegaly or significant mass effect.  No free air or abnormal intra-abdominal calcification, noting suspected pelvic phleboliths and scattered advanced atherosclerotic vascular calcifications.    Osseous structures show moderate to advanced degenerative changes of the imaged spine with minimal to mild degenerative change elsewhere.  No acute or destructive osseous process seen.  The imaged lung bases are clear.                                        CT Head Without Contrast (Final result)  Result time 03/16/25 10:15:28      Final result by Scott Andrade DO (03/16/25 10:15:28)                   Impression:      CT head: Generalized cerebral volume loss with patchy and confluent decreased attenuation supratentorial white matter while nonspecific concerning for chronic ischemic change.  Focal hypodensities left corona radiata, right basal ganglia and bilateral thalami suggestive for remote lacunar-type infarcts.  Superimposed recent infarction cannot be entirely excluded with lack of priors for comparison.  No evidence for acute intracranial hemorrhage or sulcal effacement to suggest large territory recent infarction.  Clinical correlation and further evaluation with MRI as warranted if patient  compatible.    Please note there are partially visualized fracture deformity nasal bones bilaterally overall age indeterminate and may be remote.    CT cervical spine: Degenerative change cervical spine without evidence for acute fracture or traumatic subluxation.    See above for additional details.      Electronically signed by: Scott Andrade DO  Date:    03/16/2025  Time:    10:15               Narrative:    EXAMINATION:  CT HEAD WITHOUT CONTRAST; CT CERVICAL SPINE WITHOUT CONTRAST    CLINICAL HISTORY:  Head trauma, minor (Age >= 65y);; Neck trauma (Age >= 65y);    TECHNIQUE:  CT head: Multiple sequential 5 mm axial images of the head without contrast.  Coronal and sagittal reformatted imaging from the axial acquisition.    CT cervical spine: Multiple sequential 1.25 mm axial images of the cervical spine without contrast.  Coronal and sagittal reformatted imaging from the axial acquisition.    COMPARISON:  None    FINDINGS:  CT head: Generalized cerebral volume loss with compensatory enlargement ventricles sulci and cisterns without hydrocephalus.  Hypodensity within the left corona radiata and right basal ganglia most compatible with remote lacunar-type infarcts with additional punctate foci in the bilateral thalami.  There is patchy and confluent decreased attenuation elsewhere supratentorial white matter while nonspecific concerning for advanced chronic microvascular ischemic change.  There is no evidence for acute intracranial hemorrhage or definite large territory recent infarction.  Superimposed recent infarction cannot be entirely excluded.  Further evaluation with MRI as warranted if patient compatible.  There is fracture deformity of the nasal bones bilaterally partially included an age-indeterminate which may be remote.  Visualized paranasal sinuses and mastoid air cells are clear.    CT cervical spine: Straightening of the expected normal cervical lordosis.  There is scattered degenerative disc disease  with height loss and endplate degeneration all levels there is diffuse sclerosis within C5 and C6 vertebral bodies likely degenerative.  Allowing for degenerative changes there is no evidence for acute fracture or traumatic subluxation of the cervical spine incomplete ossification posterior arch of C1 compatible with developmental variant.  No consolidation visualized lung apices.  Allowing for artifact from motion and beam hardening degenerative change most pronounced at C5/C6 with posterior disc osteophyte, uncovertebral joint hypertrophy and facet arthropathy with mild moderate central canal stenosis with moderate right greater than left bilateral bony neural foraminal stenosis.    This report was flagged in Epic as abnormal.                                        CT Cervical Spine Without Contrast (Final result)  Result time 03/16/25 10:15:28      Final result by Scott Andrade DO (03/16/25 10:15:28)                   Impression:      CT head: Generalized cerebral volume loss with patchy and confluent decreased attenuation supratentorial white matter while nonspecific concerning for chronic ischemic change.  Focal hypodensities left corona radiata, right basal ganglia and bilateral thalami suggestive for remote lacunar-type infarcts.  Superimposed recent infarction cannot be entirely excluded with lack of priors for comparison.  No evidence for acute intracranial hemorrhage or sulcal effacement to suggest large territory recent infarction.  Clinical correlation and further evaluation with MRI as warranted if patient compatible.    Please note there are partially visualized fracture deformity nasal bones bilaterally overall age indeterminate and may be remote.    CT cervical spine: Degenerative change cervical spine without evidence for acute fracture or traumatic subluxation.    See above for additional details.      Electronically signed by: Scott Andrade DO  Date:    03/16/2025  Time:    10:15                Narrative:    EXAMINATION:  CT HEAD WITHOUT CONTRAST; CT CERVICAL SPINE WITHOUT CONTRAST    CLINICAL HISTORY:  Head trauma, minor (Age >= 65y);; Neck trauma (Age >= 65y);    TECHNIQUE:  CT head: Multiple sequential 5 mm axial images of the head without contrast.  Coronal and sagittal reformatted imaging from the axial acquisition.    CT cervical spine: Multiple sequential 1.25 mm axial images of the cervical spine without contrast.  Coronal and sagittal reformatted imaging from the axial acquisition.    COMPARISON:  None    FINDINGS:  CT head: Generalized cerebral volume loss with compensatory enlargement ventricles sulci and cisterns without hydrocephalus.  Hypodensity within the left corona radiata and right basal ganglia most compatible with remote lacunar-type infarcts with additional punctate foci in the bilateral thalami.  There is patchy and confluent decreased attenuation elsewhere supratentorial white matter while nonspecific concerning for advanced chronic microvascular ischemic change.  There is no evidence for acute intracranial hemorrhage or definite large territory recent infarction.  Superimposed recent infarction cannot be entirely excluded.  Further evaluation with MRI as warranted if patient compatible.  There is fracture deformity of the nasal bones bilaterally partially included an age-indeterminate which may be remote.  Visualized paranasal sinuses and mastoid air cells are clear.    CT cervical spine: Straightening of the expected normal cervical lordosis.  There is scattered degenerative disc disease with height loss and endplate degeneration all levels there is diffuse sclerosis within C5 and C6 vertebral bodies likely degenerative.  Allowing for degenerative changes there is no evidence for acute fracture or traumatic subluxation of the cervical spine incomplete ossification posterior arch of C1 compatible with developmental variant.  No consolidation visualized lung apices.  Allowing for  artifact from motion and beam hardening degenerative change most pronounced at C5/C6 with posterior disc osteophyte, uncovertebral joint hypertrophy and facet arthropathy with mild moderate central canal stenosis with moderate right greater than left bilateral bony neural foraminal stenosis.    This report was flagged in Epic as abnormal.                                       X-Ray Knee 3 View Left (Final result)  Result time 03/16/25 10:40:31      Final result by Nikky Su MD (03/16/25 10:40:31)                   Impression:      No fracture or dislocation.      Electronically signed by: Nikky Su  Date:    03/16/2025  Time:    10:40               Narrative:    EXAMINATION:  XR KNEE 3 VIEW LEFT    CLINICAL HISTORY:  Unspecified fall, initial encounter    TECHNIQUE:  AP, lateral, and Merchant views of the left knee were performed.    COMPARISON:  None    FINDINGS:  No fracture or dislocation.  The joint spaces are maintained.  No joint effusion.  Extensive vascular calcifications are present.                                       X-Ray Chest AP Portable (Final result)  Result time 03/16/25 10:39:23      Final result by Nikky Su MD (03/16/25 10:39:23)                   Impression:      No acute cardiopulmonary disease      Electronically signed by: Nikky Su  Date:    03/16/2025  Time:    10:39               Narrative:    EXAMINATION:  XR CHEST AP PORTABLE    CLINICAL HISTORY:  Cough, unspecified    TECHNIQUE:  Single frontal view of the chest was performed.    COMPARISON:  None    FINDINGS:  The lungs are clear.  No pleural effusion.  Heart size is within normal limits.                                      Treatments you had today:   Medications   acetaminophen tablet 650 mg (650 mg Oral Given 3/16/25 0955)   gadobutroL (GADAVIST) injection 10 mL (10 mLs Intravenous Given 3/16/25 1500)       Home Care Instructions:  - Take acetaminophen (Tylenol) or ibuprofen (Advil, Motrin) for  with fevers, aches and pains   - Stay well hydrated and well rested  - Continue taking your home medications as prescribed    Follow-up plan:  - Follow-up with: Primary care doctor within 3 - 5 days  - Follow-up for additional testing and/or evaluation as directed by your primary doctor    Return to the Emergency Department for symptoms including but not limited to: worsening symptoms, severe headache, shortness of breath or chest pain, worsening cough with thick yellow/green sputum, vomiting with inability to hold down fluids, fevers greater than 100.4°F, dizziness, passing out/fainting/unconsciousness, rash, symptoms persisting beyond 7 days, or other concerning symptoms.

## 2025-03-16 NOTE — ED NOTES
Luiz Reed, a 74 y.o. male presents to the ED w/ complaint of unwitnessed fall. Patient denies falling. No LOC. Denies striking his head. No visible deformity or injury.

## 2025-03-16 NOTE — ED NOTES
Assumed care of the patient. Report received from Renae TAFOYA. Pt on continuous cardiac monitoring, continuous pulse oximetry, and automatic BP cuff cycling Q15min. Pt in hospital gown, side rails up X2, bed low and locked, and call light is placed within reach. Pt denies any complaints or needs.

## 2025-03-16 NOTE — ED NOTES
Bed: [facility]  Expected date: 3/16/25  Expected time:   Means of arrival:   Comments:

## 2025-03-17 LAB
OHS QRS DURATION: 76 MS
OHS QTC CALCULATION: 447 MS

## 2025-05-02 ENCOUNTER — HOSPITAL ENCOUNTER (EMERGENCY)
Facility: HOSPITAL | Age: 75
Discharge: HOME | End: 2025-05-03
Attending: EMERGENCY MEDICINE
Payer: MEDICARE
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
## 2025-05-02 DIAGNOSIS — M25.552 LEFT HIP PAIN: ICD-10-CM

## 2025-05-02 PROCEDURE — 99285 EMERGENCY DEPT VISIT HI MDM: CPT | Mod: 25

## 2025-05-03 VITALS
OXYGEN SATURATION: 100 % | WEIGHT: 220 LBS | HEIGHT: 67 IN | BODY MASS INDEX: 34.53 KG/M2 | HEART RATE: 88 BPM | RESPIRATION RATE: 17 BRPM | TEMPERATURE: 98 F | DIASTOLIC BLOOD PRESSURE: 89 MMHG | SYSTOLIC BLOOD PRESSURE: 182 MMHG

## 2025-05-03 LAB
ABSOLUTE EOSINOPHIL (OHS): 0.5 K/UL
ABSOLUTE MONOCYTE (OHS): 1 K/UL (ref 0.3–1)
ABSOLUTE NEUTROPHIL COUNT (OHS): 5.44 K/UL (ref 1.8–7.7)
ALBUMIN SERPL BCP-MCNC: 3.5 G/DL (ref 3.5–5.2)
ALP SERPL-CCNC: 92 UNIT/L (ref 40–150)
ALT SERPL W/O P-5'-P-CCNC: 23 UNIT/L (ref 10–44)
ANION GAP (OHS): 9 MMOL/L (ref 8–16)
ANISOCYTOSIS BLD QL SMEAR: SLIGHT
AST SERPL-CCNC: 19 UNIT/L (ref 11–45)
BASOPHILS # BLD AUTO: 0.04 K/UL
BASOPHILS NFR BLD AUTO: 0.4 %
BILIRUB SERPL-MCNC: 0.4 MG/DL (ref 0.1–1)
BNP SERPL-MCNC: <10 PG/ML (ref 0–99)
BUN SERPL-MCNC: 15 MG/DL (ref 8–23)
BURR CELLS BLD QL SMEAR: NORMAL
CALCIUM SERPL-MCNC: 9.4 MG/DL (ref 8.7–10.5)
CHLORIDE SERPL-SCNC: 109 MMOL/L (ref 95–110)
CO2 SERPL-SCNC: 23 MMOL/L (ref 23–29)
CREAT SERPL-MCNC: 0.9 MG/DL (ref 0.5–1.4)
ERYTHROCYTE [DISTWIDTH] IN BLOOD BY AUTOMATED COUNT: 16.3 % (ref 11.5–14.5)
GFR SERPLBLD CREATININE-BSD FMLA CKD-EPI: >60 ML/MIN/1.73/M2
GLUCOSE SERPL-MCNC: 96 MG/DL (ref 70–110)
HCT VFR BLD AUTO: 40.4 % (ref 40–54)
HGB BLD-MCNC: 12.5 GM/DL (ref 14–18)
IMM GRANULOCYTES # BLD AUTO: 0.04 K/UL (ref 0–0.04)
IMM GRANULOCYTES NFR BLD AUTO: 0.4 % (ref 0–0.5)
LYMPHOCYTES # BLD AUTO: 2.56 K/UL (ref 1–4.8)
MCH RBC QN AUTO: 24.7 PG (ref 27–31)
MCHC RBC AUTO-ENTMCNC: 30.9 G/DL (ref 32–36)
MCV RBC AUTO: 80 FL (ref 82–98)
NUCLEATED RBC (/100WBC) (OHS): 0 /100 WBC
OHS QRS DURATION: 82 MS
OHS QTC CALCULATION: 469 MS
OVALOCYTES BLD QL SMEAR: NORMAL
PLATELET # BLD AUTO: 229 K/UL (ref 150–450)
PLATELET BLD QL SMEAR: NORMAL
PMV BLD AUTO: 10.8 FL (ref 9.2–12.9)
POIKILOCYTOSIS BLD QL SMEAR: SLIGHT
POTASSIUM SERPL-SCNC: 4.1 MMOL/L (ref 3.5–5.1)
PROT SERPL-MCNC: 8.4 GM/DL (ref 6–8.4)
RBC # BLD AUTO: 5.07 M/UL (ref 4.6–6.2)
RELATIVE EOSINOPHIL (OHS): 5.2 %
RELATIVE LYMPHOCYTE (OHS): 26.7 % (ref 18–48)
RELATIVE MONOCYTE (OHS): 10.4 % (ref 4–15)
RELATIVE NEUTROPHIL (OHS): 56.9 % (ref 38–73)
SODIUM SERPL-SCNC: 141 MMOL/L (ref 136–145)
TROPONIN I SERPL HS-MCNC: 6 NG/L
WBC # BLD AUTO: 9.58 K/UL (ref 3.9–12.7)

## 2025-05-03 PROCEDURE — 93010 ELECTROCARDIOGRAM REPORT: CPT | Mod: ,,, | Performed by: INTERNAL MEDICINE

## 2025-05-03 PROCEDURE — 84450 TRANSFERASE (AST) (SGOT): CPT | Performed by: EMERGENCY MEDICINE

## 2025-05-03 PROCEDURE — 85025 COMPLETE CBC W/AUTO DIFF WBC: CPT

## 2025-05-03 PROCEDURE — 83880 ASSAY OF NATRIURETIC PEPTIDE: CPT

## 2025-05-03 PROCEDURE — 84484 ASSAY OF TROPONIN QUANT: CPT

## 2025-05-03 PROCEDURE — 93005 ELECTROCARDIOGRAM TRACING: CPT

## 2025-05-03 NOTE — DISCHARGE INSTRUCTIONS
You were evaluated in the emergency department today for a fall.  Although there were no findings of concern to necessitate admission to the hospital or warrant immediate surgical intervention, disease exists on a spectrum and your disease process may progress.  If this is the case, please watch your symptoms and return to the emergency department if you feel worse and are unable to discuss care with your primary care doctor in follow up in the next several days.  Specific information regarding your complaint has been provided.  Thank you for choosing [facility]!

## 2025-05-03 NOTE — ED PROVIDER NOTES
Encounter Date: 5/2/2025       History     Chief Complaint   Patient presents with    Fall     Unwitnessed fall in room found by roommate in [facility] was initially c/o lower back pain with ems +h/o dementia @ baseline per ems denies loc takes daily asa     74-year-old male with a past medical history hypertension, diabetes, dementia brought in by EMS from nursing home for evaluation after unwitnessed fall.  Patient was found in his room on the floor.  Patient states he was walking to get some ice when he tripped over his own feet and fell onto his buttocks.  He denies any loss of consciousness, head strike, chest pain, shortness of breath, or any other symptoms prior to his fall.  Patient reports left hip pain and left neck pain.    The history is provided by the patient and the nursing home. No  was used.     Review of patient's allergies indicates:  No Known Allergies  Past Medical History:   Diagnosis Date    Diabetes mellitus     borderline    Diabetes mellitus     Hypertension      History reviewed. No pertinent surgical history.  No family history on file.  Social History[1]      Physical Exam     Initial Vitals [05/02/25 2219]   BP Pulse Resp Temp SpO2   (!) 160/55 70 16 97.7 °F (36.5 °C) 98 %      MAP       --         Physical Exam    Nursing note and vitals reviewed.  Constitutional:   Chronically ill-appearing   HENT:   Head: Normocephalic and atraumatic.   Eyes: Conjunctivae and EOM are normal. Pupils are equal, round, and reactive to light.   Cardiovascular:  Normal rate, regular rhythm and normal heart sounds.           Pulmonary/Chest: Breath sounds normal. No respiratory distress. He has no wheezes. He has no rhonchi. He has no rales.   Tenderness to palpation over right chest wall without deformity or crepitus   Abdominal: Abdomen is soft. There is no abdominal tenderness.   Musculoskeletal:      Comments: Tenderness to palpation over left hip without deformity  Full passive  range of motion of hips bilaterally without pain   Pain with active range of motion of left hip     Neurological: He is alert. He has normal strength. No cranial nerve deficit.   Oriented to person and place but not to time         ED Course   Procedures  Labs Reviewed   CBC WITH DIFFERENTIAL - Abnormal       Result Value    WBC 9.58      RBC 5.07      HGB 12.5 (*)     HCT 40.4      MCV 80 (*)     MCH 24.7 (*)     MCHC 30.9 (*)     RDW 16.3 (*)     Platelet Count 229      MPV 10.8      Nucleated RBC 0      Neut % 56.9      Lymph % 26.7      Mono % 10.4      Eos % 5.2      Basophil % 0.4      Imm Grans % 0.4      Neut # 5.44      Lymph # 2.56      Mono # 1.00      Eos # 0.50      Baso # 0.04      Imm Grans # 0.04      Narrative:     This is an appended report.  These results have been appended to a previously verified report.   TROPONIN I HIGH SENSITIVITY - Normal    Troponin High Sensitive 6     B-TYPE NATRIURETIC PEPTIDE - Normal    BNP <10     COMPREHENSIVE METABOLIC PANEL - Normal    Sodium 141      Potassium 4.1      Chloride 109      CO2 23      Glucose 96      BUN 15      Creatinine 0.9      Calcium 9.4      Protein Total 8.4      Albumin 3.5      Bilirubin Total 0.4      ALP 92      AST 19      ALT 23      Anion Gap 9      eGFR >60     CBC W/ AUTO DIFFERENTIAL    Narrative:     The following orders were created for panel order CBC auto differential.  Procedure                               Abnormality         Status                     ---------                               -----------         ------                     CBC with Differential[[id number]]       Abnormal            Final result                 Please view results for these tests on the individual orders.   MORPHOLOGY    Platelet Estimate Appears Normal      Anisocytosis Slight      Poik Slight      Ovalocytes Occasional      Wilman Cells Occasional       EKG Readings: (Independently Interpreted)   Initial Reading: No STEMI. Previous EKG: Compared  with most recent EKG Previous EKG Date: 03/16/2025. Rhythm: Sinus Tachycardia. Heart Rate: 101. Conduction: 1st Degree AV Block. Other Impression: QRS/QTC within normal limits       Imaging Results              X-Ray Hip 2 or 3 views Left with Pelvis when performed (Final result)  Result time 05/03/25 03:15:27      Final result by Miki Rios MD (05/03/25 03:15:27)                   Impression:      No acute fracture or dislocation.    Electronically signed by resident: Stanton Mckeon  Date:    05/03/2025  Time:    01:16    Electronically signed by: Miki Rios  Date:    05/03/2025  Time:    03:15               Narrative:    EXAMINATION:  XR HIP WITH PELVIS WHEN PERFORMED 2 OR 3 VIEWS LEFT    CLINICAL HISTORY:  Pain in left hip    TECHNIQUE:  AP view of the pelvis and frog leg lateral view of the left hip were performed.    COMPARISON:  None    FINDINGS:  Visualized osseous pelvis is intact without evidence of fracture.  Note that the lower lumbosacral spine and coccyx are partially obscured by superimposed structures.  SI joints are symmetric.  No pubic symphysis diastasis.    Anatomic hip alignment.  No proximal femur fracture.  Degenerative changes about the anterior superior iliac spines, lesser trochanters, and right greater than left femoroacetabular joints.    Pelvic phleboliths.  Extensive femoral vascular calcifications.  Degenerative changes lumbar spine.                        Preliminary result by Stanton Mckeon MD (05/03/25 01:24:54)                   Impression:      No acute fracture or dislocation.    Electronically signed by resident: Stanton Mckeon  Date:    05/03/2025  Time:    01:16                 Narrative:    EXAMINATION:  XR HIP WITH PELVIS WHEN PERFORMED 2 OR 3 VIEWS LEFT    CLINICAL HISTORY:  Pain in left hip    TECHNIQUE:  AP view of the pelvis and frog leg lateral view of the left hip were performed.    COMPARISON:  None    FINDINGS:  Bony pelvis is intact without evidence of  fracture.  SI joints are symmetric.  No pubic symphysis diastasis.    Anatomic hip alignment.  No proximal femur fracture.  Degenerative changes about the anterior superior iliac spines, lesser trochanters, and right greater than left femoroacetabular joints.    Pelvic phleboliths.  Extensive femoral vascular calcifications.  Degenerative changes lumbar spine.                                       X-Ray Chest AP Portable (Final result)  Result time 05/03/25 03:07:10      Final result by Miki Rios MD (05/03/25 03:07:10)                   Impression:      No acute abnormality.    Electronically signed by resident: Stanton Mckeon  Date:    05/03/2025  Time:    01:07    Electronically signed by: Miki Rios  Date:    05/03/2025  Time:    03:07               Narrative:    EXAMINATION:  XR CHEST AP PORTABLE    CLINICAL HISTORY:  chest wall tenderness after fall    TECHNIQUE:  Single frontal view of the chest was performed.    COMPARISON:  Chest x-ray 03/16/2025    FINDINGS:  Overlying cardiac monitoring leads.    Lungs are symmetrically expanded.  No focal consolidation, pleural effusion, or pneumothorax.  Nodular opacities projecting over bilateral costophrenic sulci favored to represent nipple shadows.    Cardiac silhouette is normal size.  Hilar and mediastinal contours are unremarkable.  Aortic calcification.    Bones are intact.  Visualized upper abdomen is unremarkable.                        Preliminary result by Stanotn Mckeon MD (05/03/25 01:11:08)                   Impression:      No acute abnormality.    Electronically signed by resident: Stanton Mckeon  Date:    05/03/2025  Time:    01:07                 Narrative:    EXAMINATION:  XR CHEST AP PORTABLE    CLINICAL HISTORY:  chest wall tenderness after fall;    TECHNIQUE:  Single frontal view of the chest was performed.    COMPARISON:  Chest x-ray 03/16/2025    FINDINGS:  Overlying cardiac monitoring leads.    Lungs are symmetrically expanded.  No  focal consolidation, pleural effusion, or pneumothorax.  Nodular opacities projecting over bilateral costophrenic sulci favored to represent nipple shadows.    Cardiac silhouette is normal size.  Hilar and mediastinal contours are unremarkable.  Aortic calcification.    Bones are intact.  Visualized upper abdomen is unremarkable.                                       CT Head Without Contrast (Final result)  Result time 05/02/25 23:23:16      Final result by Libra Turpin MD (05/02/25 23:23:16)                   Impression:      No acute intracranial abnormalities identified.      Electronically signed by: Libra Turpin MD  Date:    05/02/2025  Time:    23:23               Narrative:    EXAMINATION:  CT HEAD WITHOUT CONTRAST    CLINICAL HISTORY:  Head trauma, minor (Age >= 65y);    TECHNIQUE:  Low dose axial images were obtained through the head.  Coronal and sagittal reformations were also performed. Contrast was not administered.    COMPARISON:  CT head and MRI brain from 03/16/2025.    FINDINGS:  There is generalized cerebral volume loss with extensive chronic microvascular ischemic disease.  Small remote infarcts are seen within the bilateral basal ganglia and thalami.  No evidence of acute/recent major vascular distribution cerebral infarction, intraparenchymal hemorrhage, or intra-axial space occupying lesion. The ventricular system is normal in size and configuration with no evidence of hydrocephalus. No effacement of the skull-base cisterns. No abnormal extra-axial fluid collections or blood products. Visualized paranasal sinuses and mastoid air cells are clear. The calvarium shows no significant abnormality.                                       CT Cervical Spine Without Contrast (Final result)  Result time 05/02/25 23:26:41      Final result by Libra Turpin MD (05/02/25 23:26:41)                   Impression:      No evidence of acute cervical spine fracture or dislocation.      Electronically  signed by: Libra Turpin MD  Date:    05/02/2025  Time:    23:26               Narrative:    EXAMINATION:  CT CERVICAL SPINE WITHOUT CONTRAST    CLINICAL HISTORY:  Neck trauma (Age >= 65y);    TECHNIQUE:  Low dose axial images, sagittal and coronal reformations were performed though the cervical spine.  Contrast was not administered.    COMPARISON:  CT cervical spine 03/16/2025.    FINDINGS:  No evidence of acute cervical spine fracture or dislocation.  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  Odontoid process is intact.  Craniocervical junction is unremarkable.  Cervical spine alignment is within normal limits.  Stable degenerative changes with intervertebral disc space narrowing and spurring are seen throughout the cervical spine, most pronounced at the C5-6 level with vertebral body sclerosis.  Multilevel facet arthropathy is seen, more pronounced on the left.    Surrounding soft tissues show no significant abnormalities.  Airway is patent.  Paraseptal emphysematous changes are seen in the visualized upper lobes.                                       Medications - No data to display  Medical Decision Making  74-year-old male with a past medical history hypertension, diabetes, dementia brought in by EMS from nursing home for evaluation after unwitnessed fall.     Differential diagnosis for fall includes, but is not limited to, mechanical fall, syncope, ACS, dysrhythmia.  Differential diagnosis for injuries includes, but is not limited to, subarachnoid hemorrhage, epidural hemorrhage, subdural hemorrhage, concussion, spinal fracture, hip fracture, rib fracture, chest wall contusion.    In emergency department, patient is hemodynamically stable, no acute distress.  Given report of unwitnessed fall and patient's history of dementia, CT head and C-spine obtained to rule out acute intracranial or spinal process, which were negative.  Left hip x-ray without evidence of fracture, and patient  with no pain with passive range of motion of left hip.  EKG without dysrhythmia or ischemia, troponin and BNP within normal limits, lowering concern for ACS or CHF.  Labs notable for mild anemia, improved from previous lab draws.  On re-evaluation, patient denies any pain.  Stable for discharge.  Return precautions given.    Amount and/or Complexity of Data Reviewed  Labs: ordered. Decision-making details documented in ED Course.  Radiology: ordered.  ECG/medicine tests: ordered and independent interpretation performed. Decision-making details documented in ED Course.               ED Course as of 05/03/25 0503   Sat May 03, 2025   0105 CBC auto differential(!)  Anemia, most recent 11.9   No leukocytosis or thrombocytopenia []   0131 Troponin I High Sensitivity  Within normal limits, lowering concern for ACS []   0248 Brain natriuretic peptide  Within normal limits, lowering concern for CHF []   0249 Comprehensive metabolic panel  No concerning electrolyte abnormalities [BH]      ED Course User Index  [BH] Herbert Chávez MD                           Clinical Impression:  Final diagnoses:  [W19.XXXA] Fall (Primary)  [M25.552] Left hip pain          ED Disposition Condition    Discharge Stable          ED Prescriptions    None       Follow-up Information    None              [1]   Social History  Tobacco Use    Smoking status: Former    Smokeless tobacco: Never   Substance Use Topics    Alcohol use: Never    Drug use: Never        Herbert Chávez MD  Resident  05/03/25 0503

## 2025-05-03 NOTE — ED NOTES
Report received from LOTTIE Sarmiento. Assume care of pt. Pt resting comfortably and independently repositioned in stretcher with bed locked in lowest position for safety. NAD noted at this time. Respirations even and unlabored and visible chest rise noted.  Patient offered bathroom assistance and denies need at this time. Pt instructed to call if assistance is needed. Pt on continuous cardiac, BP, and O2 monitoring. Call light within reach. No needs at this time. Will continue to monitor.

## 2025-05-03 NOTE — ED NOTES
While using urinal patient spilled urine onto himself causing his pants to become wet. Got supplies including paper scrub pants to get patient cleaned up and wet clothes off. Patient adamantly refused to take his wet pants off.

## 2025-05-03 NOTE — FIRST PROVIDER EVALUATION
"Medical screening exam completed.  I have conducted a focused provider triage encounter, findings are as follows:    Brief history of present illness:  74-year-old male With PMH of vascular dementia and without known blood thinner use presents status post unwitnessed fall.  He reports a mechanical fall, has slight pain to the left paraspinal neck.    Vitals:    05/02/25 2219   BP: (!) 160/55   Pulse: 70   Resp: 16   Temp: 97.7 °F (36.5 °C)   SpO2: 98%   Weight: 99.8 kg (220 lb)   Height: 5' 7" (1.702 m)       Pertinent physical exam:  TTP left paraspinal cervical spine    Brief workup plan:  CT head, CT c-spine    Preliminary workup initiated; this workup will be continued and followed by the physician or advanced practice provider that is assigned to the patient when roomed.      Herbert Godoy MD  "

## 2025-05-03 NOTE — ED NOTES
Pt arrives to ED via EMS for mechanical fall while wearing flipflops walking in his room at [facility]. Pt denies hitting his head. Pt denies blood thinners.

## 2025-07-29 ENCOUNTER — HOSPITAL ENCOUNTER (EMERGENCY)
Facility: HOSPITAL | Age: 75
Discharge: HOME | End: 2025-07-30
Attending: EMERGENCY MEDICINE
Payer: MEDICARE

## 2025-07-29 VITALS
HEART RATE: 85 BPM | WEIGHT: 220 LBS | TEMPERATURE: 98 F | SYSTOLIC BLOOD PRESSURE: 170 MMHG | DIASTOLIC BLOOD PRESSURE: 71 MMHG | RESPIRATION RATE: 14 BRPM | BODY MASS INDEX: 34.53 KG/M2 | HEIGHT: 67 IN | OXYGEN SATURATION: 99 %

## 2025-07-29 DIAGNOSIS — M54.2 NECK PAIN: Primary | ICD-10-CM
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
## 2025-07-29 DIAGNOSIS — M25.552 LEFT HIP PAIN: ICD-10-CM

## 2025-07-29 DIAGNOSIS — S09.90XA INJURY OF HEAD, INITIAL ENCOUNTER: ICD-10-CM
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
## 2025-07-29 PROBLEM — K21.9 GASTROESOPHAGEAL REFLUX DISEASE: Status: ACTIVE | Noted: 2025-07-29

## 2025-07-29 PROBLEM — E11.42 DIABETIC PERIPHERAL NEUROPATHY ASSOCIATED WITH TYPE 2 DIABETES MELLITUS: Status: ACTIVE | Noted: 2020-09-23

## 2025-07-29 PROBLEM — I10 PRIMARY HYPERTENSION: Status: ACTIVE | Noted: 2024-09-19

## 2025-07-29 PROBLEM — N40.0 BENIGN PROSTATIC HYPERPLASIA WITHOUT LOWER URINARY TRACT SYMPTOMS: Chronic | Status: ACTIVE | Noted: 2024-09-23

## 2025-07-29 PROBLEM — E11.69 TYPE 2 DIABETES MELLITUS WITH OTHER SPECIFIED COMPLICATION: Status: ACTIVE | Noted: 2024-09-23

## 2025-07-29 PROBLEM — F01.50 VASCULAR DEMENTIA: Status: ACTIVE | Noted: 2024-09-23

## 2025-07-29 PROCEDURE — 96372 THER/PROPH/DIAG INJ SC/IM: CPT | Performed by: PHYSICIAN ASSISTANT

## 2025-07-29 PROCEDURE — 99285 EMERGENCY DEPT VISIT HI MDM: CPT | Mod: 25

## 2025-07-29 PROCEDURE — 63600175 PHARM REV CODE 636 W HCPCS: Performed by: PHYSICIAN ASSISTANT

## 2025-07-29 PROCEDURE — 25000003 PHARM REV CODE 250: Performed by: PHYSICIAN ASSISTANT

## 2025-07-29 RX ORDER — ACETAMINOPHEN 325 MG/1
650 TABLET ORAL
Status: COMPLETED | OUTPATIENT
Start: 2025-07-29 | End: 2025-07-29

## 2025-07-29 RX ORDER — LIDOCAINE 50 MG/G
1 PATCH TOPICAL
Status: DISCONTINUED | OUTPATIENT
Start: 2025-07-29 | End: 2025-07-30 | Stop reason: HOSPADM

## 2025-07-29 RX ORDER — HALOPERIDOL LACTATE 5 MG/ML
5 INJECTION, SOLUTION INTRAMUSCULAR
Status: COMPLETED | OUTPATIENT
Start: 2025-07-29 | End: 2025-07-29

## 2025-07-29 RX ORDER — QUETIAPINE FUMARATE 25 MG/1
25 TABLET, FILM COATED ORAL
Status: COMPLETED | OUTPATIENT
Start: 2025-07-29 | End: 2025-07-29

## 2025-07-29 RX ADMIN — HALOPERIDOL LACTATE 5 MG: 5 INJECTION, SOLUTION INTRAMUSCULAR at 06:07

## 2025-07-29 RX ADMIN — ACETAMINOPHEN 650 MG: 325 TABLET ORAL at 02:07

## 2025-07-29 RX ADMIN — LIDOCAINE 1 PATCH: 50 PATCH CUTANEOUS at 02:07

## 2025-07-29 RX ADMIN — QUETIAPINE FUMARATE 25 MG: 25 TABLET ORAL at 06:07

## 2025-07-29 NOTE — ED PROVIDER NOTES
Encounter Date: 7/29/2025       History     Chief Complaint   Patient presents with    Fall     Pt. Is coming from [facility]. Pt. Was reaching for the remote and fell on butt and hit his head.  No LOC, No blood thinners.     75-year-old male with a PMHx of DM, HTN, vascular dementia presents to emergency department from [facility] s/p  mechanical fall.  Patient was getting out of his bed and slipped. He fell back and landed on his buttocks/left hip. He hit the back of his head on the bed.  He is complaining of left-sided neck pain and left hip pain today.  I spoke with nurse at [facility].  Fall was not witnessed however patient denies LOC.  Patient has been acting like himself.  His nurse denies abnormal behavior since fall.  He is not on AC. He denies headache, dizziness, visual changes, nausea, vomiting, dysuria, fever, chills.    The history is provided by the patient and the nursing home.     Review of patient's allergies indicates:  No Known Allergies  Past Medical History:   Diagnosis Date    Diabetes mellitus     borderline    Diabetes mellitus     Hypertension      History reviewed. No pertinent surgical history.  No family history on file.  Social History[1]  Review of Systems    Physical Exam     Initial Vitals [07/29/25 1326]   BP Pulse Resp Temp SpO2   (!) 144/70 79 18 97.9 °F (36.6 °C) 99 %      MAP       --         Physical Exam    Nursing note and vitals reviewed.  Constitutional: He appears well-developed and well-nourished. He is not diaphoretic. No distress.   HENT:   Head: Normocephalic and atraumatic.   Nose: Nose normal.   Eyes: Conjunctivae and EOM are normal.   Neck: Neck supple.   Cardiovascular:  Normal rate.           Pulmonary/Chest: No respiratory distress.   Musculoskeletal:      Cervical back: Neck supple. Tenderness present. No bony tenderness.      Left hip: Tenderness present. Normal range of motion.      Comments: No midline C spine ttp. No spinal step offs.  Left sided C spine paraspinal ttp     Neurological: He is alert and oriented to person, place, and time. He has normal strength. No sensory deficit. He exhibits normal muscle tone. Gait normal. GCS eye subscore is 4. GCS verbal subscore is 5. GCS motor subscore is 6.   Alert, conversational. Oriented to person and place. Hx of dementia. 5/5 strength in BUE/BLE   Skin: No rash noted.   Psychiatric: He has a normal mood and affect. Thought content normal.         ED Course   Procedures  Labs Reviewed - No data to display       Imaging Results    None          Medications   LIDOcaine 5 % patch 1 patch (has no administration in time range)   acetaminophen tablet 650 mg (has no administration in time range)     Medical Decision Making  75-year-old male with a PMHx of DM, HTN, vascular dementia presents to emergency department from [facility] s/p  mechanical fall.  Nontoxic appearing. Hypertensive. Afebrile. Exam as above. I will treat pain and obtain imaging at this time.    Ddx: cervical strain, C spine fracture or traumatic subluxation, hip fracture     Patient presents today for evaluation after mechanical fall.  CT head negative for acute mass hemorrhage or infarction.  He is neurovascularly intact.  He is alert and conversational.  He is oriented x2 however has a history of vascular dementia.  No focal deficits on exam.  C-spine negative for fracture or traumatic subluxation.  He has left-sided paraspinal cervical tenderness however no midline tenderness on exam.  He has 5/5 strength in bilateral upper and lower extremities.  His sensation is intact.  X-rays of hips and pelvis are negative for acute fracture or dislocation.  He has mild tenderness on exam.  He is ambulatory without difficulty.  At this time no further emergent workup is indicated and he is stable for discharge. Strict ED precautions given to return immediately for new, worsening, or concerning symptoms    Amount and/or Complexity of  Data Reviewed  Radiology: ordered.    Risk  OTC drugs.  Prescription drug management.                                          Clinical Impression:  Final diagnoses:  [W19.XXXA] Fall  [M25.552] Left hip pain                       [1]   Social History  Tobacco Use    Smoking status: Former    Smokeless tobacco: Never   Vaping Use    Vaping status: Never Used   Substance Use Topics    Alcohol use: Never    Drug use: Never        Beronica Cassidy PA-C  07/29/25 2038

## 2025-07-29 NOTE — ED TRIAGE NOTES
Patient presented for falling and hitting head and neck. Pt was using a walker and slipped on floor while walking. -LOC -blood thinners  
HTN (hypertension)

## 2025-07-30 LAB
OHS QRS DURATION: 104 MS
OHS QTC CALCULATION: 393 MS

## 2025-07-30 NOTE — DISCHARGE INSTRUCTIONS
No acute findings noted on your workup today.    Recommend Tylenol over-the-counter as needed for pain. You may take 650 mg 3x daily as needed for pain. I recommend heating pad or topical lidocaine patches as needed for pain.    Strict ED precautions given to return immediately for new, worsening, or concerning symptoms